# Patient Record
Sex: MALE | Race: WHITE | NOT HISPANIC OR LATINO | Employment: STUDENT | URBAN - METROPOLITAN AREA
[De-identification: names, ages, dates, MRNs, and addresses within clinical notes are randomized per-mention and may not be internally consistent; named-entity substitution may affect disease eponyms.]

---

## 2017-11-01 ENCOUNTER — HOSPITAL ENCOUNTER (EMERGENCY)
Facility: HOSPITAL | Age: 22
Discharge: HOME | End: 2017-11-01
Attending: EMERGENCY MEDICINE | Admitting: EMERGENCY MEDICINE
Payer: COMMERCIAL

## 2017-11-01 VITALS
RESPIRATION RATE: 18 BRPM | WEIGHT: 165 LBS | TEMPERATURE: 96.3 F | HEART RATE: 72 BPM | HEIGHT: 69 IN | SYSTOLIC BLOOD PRESSURE: 140 MMHG | OXYGEN SATURATION: 98 % | DIASTOLIC BLOOD PRESSURE: 75 MMHG | BODY MASS INDEX: 24.44 KG/M2

## 2017-11-01 DIAGNOSIS — F41.9 ANXIETY: ICD-10-CM

## 2017-11-01 DIAGNOSIS — R07.9 CHEST PAIN: Primary | ICD-10-CM

## 2017-11-01 DIAGNOSIS — F41.0 PANIC ATTACK: ICD-10-CM

## 2017-11-01 PROCEDURE — 99284 EMERGENCY DEPT VISIT MOD MDM: CPT

## 2017-11-01 PROCEDURE — 93005 ELECTROCARDIOGRAM TRACING: CPT | Performed by: EMERGENCY MEDICINE

## 2017-11-01 NOTE — ED PROVIDER NOTES
History  Chief Complaint   Patient presents with    Irregular Heart Beat     pt with palpitations today around 3 along with chest pain along with numbness- was seen at [facility] and was told to come to the ED      25year old male sent from [facility] for Chest pain  Patient states that prior to his exam that he was nervous about, he started to noted gradual worsening chest pain as the exam start time approached  Patient states he started to sweat, breath rapidly, hands were numb, and felt lightheaded  Patient denies cocaine use or other illicit drugs  Denies family hx of heart disesase or congential hx  Patient at this time is asymptomatic  Patient denies recent fever, cough, congestion, shortness of breath, nausea, vomiting, abdominal pain, dysuria, constipation, diarrhea  MDM: Patient likely presenting with anxiety/panic attack given exam context  EKG is normal sinus rhythm  Not likely an acute coronary process  I will discharge with PCP follow up  None       History reviewed  No pertinent past medical history  History reviewed  No pertinent surgical history  No family history on file  I have reviewed and agree with the history as documented  Social History   Substance Use Topics    Smoking status: Never Smoker    Smokeless tobacco: Never Used    Alcohol use Yes      Comment: occ  Review of Systems   Constitutional: Negative for appetite change, chills, diaphoresis, fatigue and fever  HENT: Negative for congestion, ear discharge, ear pain, hearing loss, postnasal drip, rhinorrhea, sneezing and sore throat  Eyes: Negative for pain, discharge and redness  Respiratory: Negative for cough, choking, chest tightness, shortness of breath, wheezing and stridor  Cardiovascular: Positive for chest pain  Negative for palpitations  Gastrointestinal: Negative for abdominal distention, abdominal pain, blood in stool, constipation, diarrhea, nausea and vomiting  Genitourinary: Negative for decreased urine volume, difficulty urinating, dysuria, flank pain, frequency and hematuria  Musculoskeletal: Negative for arthralgias, gait problem, joint swelling and neck pain  Skin: Negative for color change, pallor and rash  Allergic/Immunologic: Negative for environmental allergies, food allergies and immunocompromised state  Neurological: Negative for dizziness, seizures, weakness, light-headedness, numbness and headaches  Hematological: Negative for adenopathy  Does not bruise/bleed easily  Psychiatric/Behavioral: Negative for agitation and behavioral problems  Physical Exam  ED Triage Vitals [11/01/17 1703]   Temperature Pulse Respirations Blood Pressure SpO2   (!) 96 3 °F (35 7 °C) 86 18 149/89 98 %      Temp Source Heart Rate Source Patient Position - Orthostatic VS BP Location FiO2 (%)   Tympanic Monitor Sitting Left arm --      Pain Score       5           Orthostatic Vital Signs  Vitals:    11/01/17 1703 11/01/17 1753   BP: 149/89 140/75   Pulse: 86 72   Patient Position - Orthostatic VS: Sitting        Physical Exam   Constitutional: He is oriented to person, place, and time  He appears well-developed and well-nourished  HENT:   Head: Normocephalic and atraumatic  Nose: Nose normal    Mouth/Throat: Oropharynx is clear and moist    Eyes: Conjunctivae and EOM are normal  Pupils are equal, round, and reactive to light  Neck: Normal range of motion  Neck supple  Cardiovascular: Normal rate, regular rhythm and normal heart sounds  Exam reveals no gallop and no friction rub  No murmur heard  Pulmonary/Chest: Effort normal and breath sounds normal  No respiratory distress  He has no wheezes  He has no rales  Abdominal: Soft  Bowel sounds are normal  He exhibits no distension  There is no tenderness  There is no rebound and no guarding  Musculoskeletal: Normal range of motion  Neurological: He is alert and oriented to person, place, and time  Skin: Skin is warm and dry  Psychiatric: He has a normal mood and affect  His behavior is normal    Nursing note and vitals reviewed  ED Medications  Medications - No data to display    Diagnostic Studies  Results Reviewed     None                 No orders to display         Procedures  Procedures      Phone Consults  ED Phone Contact    ED Course  ED Course                                MDM  CritCare Time    Disposition  Final diagnoses:   Chest pain   Anxiety   Panic attack     Time reflects when diagnosis was documented in both MDM as applicable and the Disposition within this note     Time User Action Codes Description Comment    11/1/2017  6:09 PM Tomás Chambers Add [R07 9] Chest pain     11/1/2017  6:09 PM Tomás Chambers Add [F41 9] Anxiety     11/1/2017  6:09 PM Lucía Alan Add [F41 0] Panic attack       ED Disposition     ED Disposition Condition Comment    Discharge  [address]  discharge to home/self care  Condition at discharge: Stable        Follow-up Information     Follow up With Specialties Details Why Contact Raul Pride DO Family Medicine In 3 days  [address]  [phone]          There are no discharge medications for this patient  No discharge procedures on file  ED Provider  Attending physically available and evaluated [address] CHRIS managed the patient along with the ED Attending      Electronically Signed by         Carmen Escalante MD  Resident  11/02/17 3410

## 2017-11-01 NOTE — DISCHARGE INSTRUCTIONS
Follow up with pcp in 1-3 days  If you begin to have worse chest pain, fever, return to the ED immediately  Anxiety   WHAT YOU SHOULD KNOW:   Anxiety is a condition that causes you to feel excessive worry, uneasiness, or fear  Family or work stress, smoking, caffeine, and alcohol can increase your risk for anxiety  Certain medicines or health conditions can also increase your risk  Anxiety may begin gradually, and can become a long-term condition if it is not managed or treated  AFTER YOU LEAVE:   Medicines:   · Medicines  can help you feel more calm and relaxed, and decrease your symptoms  · Take your medicine as directed  Contact your healthcare provider if you think your medicine is not helping or if you have side effects  Tell him if you are allergic to any medicine  Keep a list of the medicines, vitamins, and herbs you take  Include the amounts, and when and why you take them  Bring the list or the pill bottles to follow-up visits  Carry your medicine list with you in case of an emergency  Follow up with your healthcare provider within 2 weeks or as directed:  Write down your questions so you remember to ask them during your visits  Manage anxiety:   · Go to counseling as directed  Cognitive behavioral therapy can help you understand and change how you react to events that trigger your symptoms  · Find ways to manage your symptoms  Activities such as exercise, meditation, or listening to music can help you relax  · Practice deep breathing  Breathing can change how your body reacts to stress  Focus on taking slow, deep breaths several times a day, or during an anxiety attack  Breathe in through your nose, and out through your mouth  · Avoid caffeine  Caffeine can make your symptoms worse  Avoid foods or drinks that are meant to increase your energy level  · Limit or avoid alcohol  Ask your healthcare provider if alcohol is safe for you   You may not be able to drink alcohol if you take certain anxiety or depression medicines  Limit alcohol to 1 drink per day if you are a woman  Limit alcohol to 2 drinks per day if you are a man  A drink of alcohol is 12 ounces of beer, 5 ounces of wine, or 1½ ounces of liquor  Contact your healthcare provider if:   · Your symptoms get worse or do not get better with treatment  · You think your medicine may be causing side effects  · Your anxiety keeps you from doing your regular daily activities  · You have new symptoms since your last visit  · You have questions or concerns about your condition or care  Seek care immediately or call 911 if:   · You have chest pain, tightness, or heaviness that may spread to your shoulders, arms, jaw, neck, or back  · You feel like hurting yourself or someone else  · You feel dizzy, lightheaded, or faint  © 2014 [address] is for End User's use only and may not be sold, redistributed or otherwise used for commercial purposes  All illustrations and images included in CareNotes® are the copyrighted property of [employer]  or [employer]  The above information is an  only  It is not intended as medical advice for individual conditions or treatments  Talk to your doctor, nurse or pharmacist before following any medical regimen to see if it is safe and effective for you

## 2017-11-01 NOTE — ED ATTENDING ATTESTATION
Justin Grande DO, saw and evaluated the patient  I have discussed the patient with the resident/non-physician practitioner and agree with the resident's/non-physician practitioner's findings, Plan of Care, and MDM as documented in the resident's/non-physician practitioner's note, except where noted  All available labs and Radiology studies were reviewed  At this point I agree with the current assessment done in the Emergency Department  I have conducted an independent evaluation of this patient a history and physical is as follows:  Patient is a 67-year-old male who was taking a examination at school began to have palpitations and hyperventilating  He was seen at the [facility] and recommended emergency department  EKG is normal sinus rhythm no acute ischemic changes, normal intervals  No syncopal events  PERC criteria is negative  Patient states symptoms of since resolved  No chest pain or shortness of breath  Lungs are clear, heart is regular, abdomen is soft was no calf pain tenderness or asymmetry  Denies any SI or HI  Patient otherwise without complaints at this time  Will discharge home and recommend follow up with primary care physician and return if condition worsens      Critical Care Time  CritCare Time

## 2017-11-02 LAB
ATRIAL RATE: 80 BPM
P AXIS: 70 DEGREES
PR INTERVAL: 124 MS
QRS AXIS: 84 DEGREES
QRSD INTERVAL: 92 MS
QT INTERVAL: 334 MS
QTC INTERVAL: 385 MS
T WAVE AXIS: 60 DEGREES
VENTRICULAR RATE: 80 BPM